# Patient Record
Sex: MALE | Race: ASIAN | NOT HISPANIC OR LATINO
[De-identification: names, ages, dates, MRNs, and addresses within clinical notes are randomized per-mention and may not be internally consistent; named-entity substitution may affect disease eponyms.]

---

## 2021-04-19 ENCOUNTER — APPOINTMENT (OUTPATIENT)
Dept: GASTROENTEROLOGY | Facility: CLINIC | Age: 61
End: 2021-04-19
Payer: COMMERCIAL

## 2021-04-19 VITALS
HEIGHT: 67 IN | BODY MASS INDEX: 18.99 KG/M2 | SYSTOLIC BLOOD PRESSURE: 120 MMHG | OXYGEN SATURATION: 12 % | RESPIRATION RATE: 70 BRPM | WEIGHT: 121 LBS | DIASTOLIC BLOOD PRESSURE: 70 MMHG

## 2021-04-19 DIAGNOSIS — Z80.3 FAMILY HISTORY OF MALIGNANT NEOPLASM OF BREAST: ICD-10-CM

## 2021-04-19 DIAGNOSIS — R07.89 OTHER CHEST PAIN: ICD-10-CM

## 2021-04-19 PROCEDURE — 99203 OFFICE O/P NEW LOW 30 MIN: CPT

## 2021-04-19 PROCEDURE — 99072 ADDL SUPL MATRL&STAF TM PHE: CPT

## 2021-04-19 RX ORDER — EZETIMIBE 10 MG/1
TABLET ORAL
Refills: 0 | Status: ACTIVE | COMMUNITY

## 2021-04-19 RX ORDER — CHLORHEXIDINE GLUCONATE, 0.12% ORAL RINSE 1.2 MG/ML
0.12 SOLUTION DENTAL
Qty: 473 | Refills: 0 | Status: DISCONTINUED | COMMUNITY
Start: 2021-01-14

## 2021-04-19 RX ORDER — ESOMEPRAZOLE MAGNESIUM 40 MG/1
40 CAPSULE, DELAYED RELEASE ORAL
Refills: 0 | Status: ACTIVE | COMMUNITY

## 2021-04-19 RX ORDER — AMOXICILLIN 500 MG/1
500 CAPSULE ORAL
Qty: 21 | Refills: 0 | Status: DISCONTINUED | COMMUNITY
Start: 2021-01-14

## 2021-04-19 RX ORDER — MONTELUKAST SODIUM 10 MG/1
TABLET, FILM COATED ORAL
Refills: 0 | Status: ACTIVE | COMMUNITY

## 2021-04-22 NOTE — HISTORY OF PRESENT ILLNESS
[FreeTextEntry1] : 61 yo male, hx of colon polyps with Dr. Packer within 1 year as well as upper endoscopy. Otherwise feels well. No N/v/ gerd or constpiation. Understand need for colonoscopy with 5 year. Pt had extesnive workup and relationiship with Dr. Packer in last few years and entire medical records were reviewed

## 2021-06-28 ENCOUNTER — APPOINTMENT (OUTPATIENT)
Dept: GASTROENTEROLOGY | Facility: CLINIC | Age: 61
End: 2021-06-28

## 2021-07-15 ENCOUNTER — NON-APPOINTMENT (OUTPATIENT)
Age: 61
End: 2021-07-15

## 2021-08-09 ENCOUNTER — APPOINTMENT (OUTPATIENT)
Dept: GASTROENTEROLOGY | Facility: CLINIC | Age: 61
End: 2021-08-09
Payer: COMMERCIAL

## 2021-08-09 VITALS
BODY MASS INDEX: 24.33 KG/M2 | WEIGHT: 155 LBS | HEIGHT: 67 IN | DIASTOLIC BLOOD PRESSURE: 80 MMHG | SYSTOLIC BLOOD PRESSURE: 140 MMHG | TEMPERATURE: 97.2 F

## 2021-08-09 DIAGNOSIS — K21.9 GASTRO-ESOPHAGEAL REFLUX DISEASE W/OUT ESOPHAGITIS: ICD-10-CM

## 2021-08-09 DIAGNOSIS — K58.9 IRRITABLE BOWEL SYNDROME W/OUT DIARRHEA: ICD-10-CM

## 2021-08-09 DIAGNOSIS — E78.5 HYPERLIPIDEMIA, UNSPECIFIED: ICD-10-CM

## 2021-08-09 PROCEDURE — 99214 OFFICE O/P EST MOD 30 MIN: CPT

## 2021-08-09 RX ORDER — DOXYCYCLINE HYCLATE 100 MG/1
100 CAPSULE ORAL
Qty: 14 | Refills: 1 | Status: ACTIVE | COMMUNITY
Start: 2021-08-09 | End: 1900-01-01

## 2021-08-09 RX ORDER — SUCRALFATE 1 G/1
1 TABLET ORAL
Refills: 0 | Status: DISCONTINUED | COMMUNITY
End: 2021-08-09

## 2021-08-09 NOTE — ASSESSMENT
[FreeTextEntry1] : 59 yo male, hx ibs, colon polyps and occ gerd, doing well. No active GI issues. Eating prudent diet; understans need for colonoscpoy within 5 years.  \par \par Pt rx doxcycline 100 mg po bid x 7 days .

## 2021-08-09 NOTE — HISTORY OF PRESENT ILLNESS
[FreeTextEntry1] : 61 yo male, hx of colon polyps with Dr. Packer within 1 year as well as upper endoscopy. Otherwise feels well. No N/v/ gerd or constpiation. Understand need for colonoscopy with 5 year. Pt had extesnive workup and relationiship with Dr. Packer in last few years .\par \par Pt returns today complaining of " gassiness."  Having normal bms. \par \par

## 2021-08-09 NOTE — PHYSICAL EXAM
[General Appearance - Alert] : alert [General Appearance - In No Acute Distress] : in no acute distress [Sclera] : the sclera and conjunctiva were normal [PERRL With Normal Accommodation] : pupils were equal in size, round, and reactive to light [Extraocular Movements] : extraocular movements were intact [Outer Ear] : the ears and nose were normal in appearance [Oropharynx] : the oropharynx was normal [Neck Appearance] : the appearance of the neck was normal [Neck Cervical Mass (___cm)] : no neck mass was observed [Jugular Venous Distention Increased] : there was no jugular-venous distention [Thyroid Diffuse Enlargement] : the thyroid was not enlarged [Thyroid Nodule] : there were no palpable thyroid nodules [Auscultation Breath Sounds / Voice Sounds] : lungs were clear to auscultation bilaterally [Heart Rate And Rhythm] : heart rate was normal and rhythm regular [Heart Sounds] : normal S1 and S2 [Heart Sounds Gallop] : no gallops [Murmurs] : no murmurs [Heart Sounds Pericardial Friction Rub] : no pericardial rub [Bowel Sounds] : normal bowel sounds [Abdomen Soft] : soft [Abdomen Tenderness] : non-tender [Abdomen Mass (___ Cm)] : no abdominal mass palpated [No CVA Tenderness] : no ~M costovertebral angle tenderness [No Spinal Tenderness] : no spinal tenderness [Abnormal Walk] : normal gait [Musculoskeletal - Swelling] : no joint swelling seen [Nail Clubbing] : no clubbing  or cyanosis of the fingernails [Motor Tone] : muscle strength and tone were normal [Skin Color & Pigmentation] : normal skin color and pigmentation [Skin Turgor] : normal skin turgor [] : no rash [Deep Tendon Reflexes (DTR)] : deep tendon reflexes were 2+ and symmetric [Sensation] : the sensory exam was normal to light touch and pinprick [No Focal Deficits] : no focal deficits [Oriented To Time, Place, And Person] : oriented to person, place, and time [Impaired Insight] : insight and judgment were intact [Affect] : the affect was normal

## 2022-01-02 ENCOUNTER — RX RENEWAL (OUTPATIENT)
Age: 62
End: 2022-01-02

## 2022-06-28 ENCOUNTER — RX RENEWAL (OUTPATIENT)
Age: 62
End: 2022-06-28

## 2022-06-28 RX ORDER — ESOMEPRAZOLE MAGNESIUM 40 MG/1
40 CAPSULE, DELAYED RELEASE ORAL
Qty: 90 | Refills: 1 | Status: ACTIVE | COMMUNITY
Start: 2021-07-13 | End: 1900-01-01

## 2024-07-16 ENCOUNTER — APPOINTMENT (OUTPATIENT)
Dept: CARDIOLOGY | Facility: CLINIC | Age: 64
End: 2024-07-16
Payer: COMMERCIAL

## 2024-07-16 ENCOUNTER — APPOINTMENT (OUTPATIENT)
Dept: CARDIOLOGY | Facility: CLINIC | Age: 64
End: 2024-07-16

## 2024-07-16 VITALS
DIASTOLIC BLOOD PRESSURE: 73 MMHG | SYSTOLIC BLOOD PRESSURE: 118 MMHG | OXYGEN SATURATION: 95 % | HEART RATE: 65 BPM | BODY MASS INDEX: 24.75 KG/M2 | RESPIRATION RATE: 18 BRPM | WEIGHT: 158 LBS

## 2024-07-16 DIAGNOSIS — E78.5 HYPERLIPIDEMIA, UNSPECIFIED: ICD-10-CM

## 2024-07-16 DIAGNOSIS — I10 ESSENTIAL (PRIMARY) HYPERTENSION: ICD-10-CM

## 2024-07-16 DIAGNOSIS — R06.02 SHORTNESS OF BREATH: ICD-10-CM

## 2024-07-16 PROCEDURE — 93000 ELECTROCARDIOGRAM COMPLETE: CPT | Mod: 59

## 2024-07-16 PROCEDURE — 99204 OFFICE O/P NEW MOD 45 MIN: CPT | Mod: 25

## 2024-07-16 PROCEDURE — 93015 CV STRESS TEST SUPVJ I&R: CPT

## 2024-07-16 PROCEDURE — 93306 TTE W/DOPPLER COMPLETE: CPT

## 2024-07-16 PROCEDURE — ZZZZZ: CPT

## 2024-08-16 ENCOUNTER — APPOINTMENT (OUTPATIENT)
Dept: CARDIOLOGY | Facility: CLINIC | Age: 64
End: 2024-08-16
Payer: COMMERCIAL

## 2024-08-16 VITALS
OXYGEN SATURATION: 96 % | BODY MASS INDEX: 25.06 KG/M2 | SYSTOLIC BLOOD PRESSURE: 167 MMHG | DIASTOLIC BLOOD PRESSURE: 79 MMHG | HEART RATE: 75 BPM | RESPIRATION RATE: 18 BRPM | WEIGHT: 160 LBS

## 2024-08-16 DIAGNOSIS — R06.02 SHORTNESS OF BREATH: ICD-10-CM

## 2024-08-16 DIAGNOSIS — I25.10 ATHEROSCLEROTIC HEART DISEASE OF NATIVE CORONARY ARTERY W/OUT ANGINA PECTORIS: ICD-10-CM

## 2024-08-16 DIAGNOSIS — I10 ESSENTIAL (PRIMARY) HYPERTENSION: ICD-10-CM

## 2024-08-16 DIAGNOSIS — E78.5 HYPERLIPIDEMIA, UNSPECIFIED: ICD-10-CM

## 2024-08-16 PROCEDURE — 99214 OFFICE O/P EST MOD 30 MIN: CPT

## 2024-08-16 PROCEDURE — G2211 COMPLEX E/M VISIT ADD ON: CPT | Mod: NC

## 2024-08-16 NOTE — REASON FOR VISIT
[FreeTextEntry3] : Dr. Sargent [FreeTextEntry1] : 63 year old M with HTN, HLD, pre-DM, severe pLAD stenosis on CCTA 8/13/24 who presents for f/u.  Meds: ASA 81, atorva 10, hydralazine 25 BID EKG 7/13/24 showed sinus rhythm without ischemic changes CCTA 8/13/24 Ca score 155, multivessel CAD (minimal LM, severe pLAD, minimal RCA, minimal LCx)

## 2024-08-16 NOTE — HISTORY OF PRESENT ILLNESS
[FreeTextEntry1] : Pt recently returned from trip. He continues to feel tired/SOB after walking on steep incline to the point where he has to stop. He has no symptoms on level ground. No CP, palpitation, dizziness, LOC, orthopnea, PND, or LE edema. He started ASA 81 without issues.  7/16/24 - Pt has been stable since last visit. He works in [employer] and walks at least 10 blocks without limitation. Occasionally when he climbs stairs or goes hiking, he feels a little tired/winded to the point where he has to stop and rest. No chest pain, palpitation, dizziness, LOC, orthopnea, PND, or LE edema.  Last seen by Dr. Dobbins 9/26/12 - Patient is asymptomatic with dizziness or palpitation but vague chest pain of atypical for angina, and exertional shortness of breath but mild.

## 2024-08-16 NOTE — ASSESSMENT
[FreeTextEntry1] : 63 year old M with HTN, HLD, pre-DM, severe pLAD stenosis on CCTA 8/13/24 who presents for f/u.  1) SOB on exertion 2) CAD, pLAD stenosis on CCTA 3) HTN, HLD - TTE 7/16/24 showed normal LV and RV function without significant valvular disease - CCTA 8/13/24 Ca score 155, multivessel CAD (minimal LM, severe pLAD, minimal RCA, minimal LCx) - Continue ASA 81mg daily - Increase atorvastatin to 20mg daily, goal LDL 55-70 - Given severe CAD on CCTA with SOB, I advised pt to undergo LHC  - Continue good BP control  4) Follow-up, 1 month pending C

## 2024-08-22 ENCOUNTER — INPATIENT (INPATIENT)
Facility: HOSPITAL | Age: 64
LOS: 0 days | Discharge: HOME | DRG: 322 | End: 2024-08-23
Attending: INTERNAL MEDICINE | Admitting: INTERNAL MEDICINE
Payer: COMMERCIAL

## 2024-08-22 ENCOUNTER — TRANSCRIPTION ENCOUNTER (OUTPATIENT)
Age: 64
End: 2024-08-22

## 2024-08-22 VITALS
DIASTOLIC BLOOD PRESSURE: 81 MMHG | HEART RATE: 58 BPM | OXYGEN SATURATION: 97 % | SYSTOLIC BLOOD PRESSURE: 167 MMHG | TEMPERATURE: 98 F | WEIGHT: 160.06 LBS | HEIGHT: 67 IN | RESPIRATION RATE: 20 BRPM

## 2024-08-22 DIAGNOSIS — R93.1 ABNORMAL FINDINGS ON DIAGNOSTIC IMAGING OF HEART AND CORONARY CIRCULATION: ICD-10-CM

## 2024-08-22 DIAGNOSIS — I25.10 ATHEROSCLEROTIC HEART DISEASE OF NATIVE CORONARY ARTERY WITHOUT ANGINA PECTORIS: ICD-10-CM

## 2024-08-22 LAB
ANION GAP SERPL CALC-SCNC: 7 MMOL/L — SIGNIFICANT CHANGE UP (ref 5–17)
BUN SERPL-MCNC: 15 MG/DL — SIGNIFICANT CHANGE UP (ref 7–23)
CALCIUM SERPL-MCNC: 9.1 MG/DL — SIGNIFICANT CHANGE UP (ref 8.5–10.1)
CHLORIDE SERPL-SCNC: 109 MMOL/L — HIGH (ref 96–108)
CO2 SERPL-SCNC: 26 MMOL/L — SIGNIFICANT CHANGE UP (ref 22–31)
CREAT SERPL-MCNC: 1.1 MG/DL — SIGNIFICANT CHANGE UP (ref 0.5–1.3)
EGFR: 75 ML/MIN/1.73M2 — SIGNIFICANT CHANGE UP
GLUCOSE SERPL-MCNC: 116 MG/DL — HIGH (ref 70–99)
HCT VFR BLD CALC: 48 % — SIGNIFICANT CHANGE UP (ref 39–50)
HGB BLD-MCNC: 15.8 G/DL — SIGNIFICANT CHANGE UP (ref 13–17)
MCHC RBC-ENTMCNC: 28.7 PG — SIGNIFICANT CHANGE UP (ref 27–34)
MCHC RBC-ENTMCNC: 32.9 GM/DL — SIGNIFICANT CHANGE UP (ref 32–36)
MCV RBC AUTO: 87.3 FL — SIGNIFICANT CHANGE UP (ref 80–100)
NRBC # BLD: 0 /100 WBCS — SIGNIFICANT CHANGE UP (ref 0–0)
PLATELET # BLD AUTO: 211 K/UL — SIGNIFICANT CHANGE UP (ref 150–400)
POTASSIUM SERPL-MCNC: 4 MMOL/L — SIGNIFICANT CHANGE UP (ref 3.5–5.3)
POTASSIUM SERPL-SCNC: 4 MMOL/L — SIGNIFICANT CHANGE UP (ref 3.5–5.3)
RBC # BLD: 5.5 M/UL — SIGNIFICANT CHANGE UP (ref 4.2–5.8)
RBC # FLD: 14.1 % — SIGNIFICANT CHANGE UP (ref 10.3–14.5)
SODIUM SERPL-SCNC: 142 MMOL/L — SIGNIFICANT CHANGE UP (ref 135–145)
WBC # BLD: 5.76 K/UL — SIGNIFICANT CHANGE UP (ref 3.8–10.5)
WBC # FLD AUTO: 5.76 K/UL — SIGNIFICANT CHANGE UP (ref 3.8–10.5)

## 2024-08-22 PROCEDURE — 93458 L HRT ARTERY/VENTRICLE ANGIO: CPT | Mod: 26,59

## 2024-08-22 PROCEDURE — 93571 IV DOP VEL&/PRESS C FLO 1ST: CPT | Mod: 26,52,LD

## 2024-08-22 PROCEDURE — 99152 MOD SED SAME PHYS/QHP 5/>YRS: CPT

## 2024-08-22 PROCEDURE — 92928 PRQ TCAT PLMT NTRAC ST 1 LES: CPT | Mod: LD

## 2024-08-22 PROCEDURE — 92978 ENDOLUMINL IVUS OCT C 1ST: CPT | Mod: 26,LD

## 2024-08-22 PROCEDURE — 93010 ELECTROCARDIOGRAM REPORT: CPT | Mod: 77

## 2024-08-22 RX ORDER — FAMOTIDINE 10 MG/ML
1 INJECTION INTRAVENOUS
Refills: 0 | DISCHARGE

## 2024-08-22 RX ORDER — SODIUM CHLORIDE 9 MG/ML
1000 INJECTION INTRAMUSCULAR; INTRAVENOUS; SUBCUTANEOUS
Refills: 0 | Status: DISCONTINUED | OUTPATIENT
Start: 2024-08-22 | End: 2024-08-23

## 2024-08-22 RX ORDER — ASCORBIC ACID/ASCORBATE SODIUM 500 MG
2 TABLET,CHEWABLE ORAL
Refills: 0 | DISCHARGE

## 2024-08-22 RX ORDER — HYDRALAZINE HCL 50 MG
1 TABLET ORAL
Refills: 0 | DISCHARGE

## 2024-08-22 RX ORDER — MONTELUKAST SODIUM 5 MG/1
10 TABLET, CHEWABLE ORAL DAILY
Refills: 0 | Status: DISCONTINUED | OUTPATIENT
Start: 2024-08-22 | End: 2024-08-23

## 2024-08-22 RX ORDER — CLOBETASOL PROPIONATE 0.5 MG/G
1 AEROSOL, FOAM TOPICAL
Refills: 0 | DISCHARGE

## 2024-08-22 RX ORDER — SODIUM CHLORIDE 9 MG/ML
1000 INJECTION INTRAMUSCULAR; INTRAVENOUS; SUBCUTANEOUS
Refills: 0 | Status: DISCONTINUED | OUTPATIENT
Start: 2024-08-22 | End: 2024-08-22

## 2024-08-22 RX ORDER — LORATADINE 10 MG/1
1 CAPSULE, LIQUID FILLED ORAL
Refills: 0 | DISCHARGE

## 2024-08-22 RX ORDER — OLOPATADINE HYDROCHLORIDE AND MOMETASONE FUROATE 25; 665 UG/1; UG/1
2 SPRAY, METERED NASAL
Refills: 0 | DISCHARGE

## 2024-08-22 RX ORDER — MONTELUKAST SODIUM 5 MG/1
1 TABLET, CHEWABLE ORAL
Refills: 0 | DISCHARGE

## 2024-08-22 RX ORDER — HYDRALAZINE HCL 50 MG
25 TABLET ORAL
Refills: 0 | Status: DISCONTINUED | OUTPATIENT
Start: 2024-08-22 | End: 2024-08-23

## 2024-08-22 RX ORDER — FAMOTIDINE 10 MG/ML
20 INJECTION INTRAVENOUS
Refills: 0 | Status: DISCONTINUED | OUTPATIENT
Start: 2024-08-22 | End: 2024-08-23

## 2024-08-22 RX ORDER — ASPIRIN 81 MG
1 TABLET, DELAYED RELEASE (ENTERIC COATED) ORAL
Qty: 90 | Refills: 3
Start: 2024-08-22 | End: 2025-08-16

## 2024-08-22 RX ORDER — ASPIRIN 81 MG
81 TABLET, DELAYED RELEASE (ENTERIC COATED) ORAL EVERY 24 HOURS
Refills: 0 | Status: DISCONTINUED | OUTPATIENT
Start: 2024-08-23 | End: 2024-08-23

## 2024-08-22 RX ORDER — LORATADINE 10 MG/1
10 CAPSULE, LIQUID FILLED ORAL DAILY
Refills: 0 | Status: DISCONTINUED | OUTPATIENT
Start: 2024-08-22 | End: 2024-08-23

## 2024-08-22 RX ADMIN — Medication 80 MILLIGRAM(S): at 22:10

## 2024-08-22 RX ADMIN — FAMOTIDINE 20 MILLIGRAM(S): 10 INJECTION INTRAVENOUS at 17:55

## 2024-08-22 RX ADMIN — SODIUM CHLORIDE 75 MILLILITER(S): 9 INJECTION INTRAMUSCULAR; INTRAVENOUS; SUBCUTANEOUS at 13:01

## 2024-08-22 RX ADMIN — Medication 25 MILLIGRAM(S): at 17:55

## 2024-08-22 RX ADMIN — SODIUM CHLORIDE 250 MILLILITER(S): 9 INJECTION INTRAMUSCULAR; INTRAVENOUS; SUBCUTANEOUS at 08:25

## 2024-08-22 NOTE — H&P CARDIOLOGY - HISTORY OF PRESENT ILLNESS
This is a 63 year old gentleman with a PMH of HTN, HLD, referred by Dr Geovanny Chavarria for a LHC with Dr Silvia Cano this am, secondary to an abnormal NST and cardiac CTA. The pt. denies any c/o CP SOB or palpitations and is in no distress.    PRE-PROCEDURE ASSESSMENT  -NPO after midnight confirmed  -IV insert  -IV fluid hydration ordered and infusing  -Patient seen and examined  -Labs reviewed  -Pre-procedure teaching completed with patient   -Consent obtained by Interventional Cardiologist  -Questions answered  TTE-7/16/24  EF 60%  Normal RV and LV function Trace MR & TR minimal to mild AR    Exercise Stress Test  7/16/24  + for ischemia  S-T depression inferiorly and V4-V6

## 2024-08-22 NOTE — CHART NOTE - NSCHARTNOTEFT_GEN_A_CORE
Post Cardiac Catheterization Chart Note      Prelim cath report:   [facility] via RRA  80% pLAD, s/p NICHOLAS x 1  full/official report to follow      Patient without complaints. Denies CP, SOB, palpitations, N/V, fever/chills, abd pain, numbness/tingling/weakness, other c/o at this time.    A+O x 3, neurologically intact  RRA access site stable (clean, dry, intact, without bleeding, heat, erythema, or hematoma). Radial band in place.  RUE motor, neuro, circ intact.  Hemodynamically stable, neurologically intact, VS stable, afebrile    Post PCI ECG: NSR, no acute changes      A/P: s/p [facility]  CAD w/ PCI as above    Admit to CPU for overnight observation post PCI  post cath/PCI routine VS, access site, neuro-vascular monitoring and RUE post access precautions ordered  post cath access site precautions reviewed with pt who verbalized good understanding  post cath hydration as ordered  Bedrest. May get OOB 30 minutes after radial band removed if wrist and hemodynamics remain stable   EKG post cath done  f/u labs and EKG in am  continue dual anti platelet therapy with aspirin AND clopidogrel   Pt education provided/reinforced re: importance of strict adherence to uninterrupted DAPT for minimum of 9-12 months (cardiologist will determine duration)  Patient educated on benefits of Cardiac Rehab Program; referral provided to patient. Referral faxed and copy placed in medical record. Patient given list of locations with phone numbers of local rehab facilities and advised to contact their insurance company for participating providers. Patient educated on need to bring discharge documents including cardiovascular history, medications, and testing/treatments to first appointment.  continue statin  may resume prior diet  Lifestyle modifications discussed to reduce cardiovascular risk factors including weight reduction, smoking cessation (referral provided if applicable), medication compliance, and routine follow up with Cardiologist to track your BMI, cholesterol, and glucose levels.   Discharge in am if stable  follow-up next week with Dr Cano for post PCI check  follow-up in 2 weeks with outpatient/referring cardiologist

## 2024-08-22 NOTE — DISCHARGE NOTE PROVIDER - CARE PROVIDER_API CALL
Geovanny Chavarria  Cardiovascular Disease  [id number] [place] [id number]  Phone: [phone]  Fax: [phone]  Follow Up Time: 2 weeks    Silvia Cano  [address] [place] [id number]  Phone: [phone]  Fax: (   )    -  Follow Up Time:    Geovanny Chavarria  Cardiovascular Disease  [address] [place] [address]  Phone: [phone]  Fax: [phone]  Follow Up Time: 2 weeks    Silvia Cano  [address] [place] [address]  Phone: [phone]  Fax: (   )    -  Scheduled Appointment: 08/30/2024 10:15 AM

## 2024-08-22 NOTE — DISCHARGE NOTE PROVIDER - NSDCMRMEDTOKEN_GEN_ALL_CORE_FT
Albuterol (Eqv-ProAir HFA) 90 mcg/inh inhalation aerosol: 2 puff(s) inhaled 2 times a day as needed for  shortness of breath and/or wheezing  ascorbic acid 500 mg oral tablet, chewable: 2 tab(s) chewed once a day  aspirin 81 mg oral delayed release tablet: 1 tab(s) orally every 24 hours  atorvastatin 80 mg oral tablet: 1 tab(s) orally once a day (at bedtime)  clobetasol 0.025% topical cream: Apply topically to affected area once a day  clopidogrel 75 mg oral tablet: 1 tab(s) orally every 24 hours  famotidine 20 mg oral tablet: 1 tab(s) orally 2 times a day  hydrALAZINE 25 mg oral tablet: 1 tab(s) orally 2 times a day  loratadine 10 mg oral tablet: 1 tab(s) orally once a day  montelukast 10 mg oral tablet: 1 tab(s) orally once a day  Ryaltris 25 mcg-665 mcg/inh nasal spray: 2 spray(s) in each nostril once a day

## 2024-08-22 NOTE — PRE-OP CHECKLIST - IDENTIFICATION BAND VERIFIED
Home health nurse Mitchel Núñez called states she visited the pt today. Nurse assessed the pt's lungs and upon assessment lungs sounds were  diminished in the lower lobes. Pt also has a cough with white sputum.  The nurse is requesting a verbal order for an xray t done

## 2024-08-22 NOTE — DISCHARGE NOTE PROVIDER - NSDCFUSCHEDAPPT_GEN_ALL_CORE_FT
Silvia Cano  [facility]  CARDIOLOGY [address]  Scheduled Appointment: 08/30/2024

## 2024-08-22 NOTE — DISCHARGE NOTE PROVIDER - NSDCFUADDINST_GEN_ALL_CORE_FT
Wound Care:   the day AFTER your procedure...     Remove the bandage from the site and gently clean with soap and water then pat dry; leave open to air.     You may take a brief shower     Do NOT apply lotions, creams, powders, ointments, or perfumes to your incision site unless prescribed by your physician     Do NOT soak your procedure site for 1 week (no baths, no pools, no tubs, etc...)     Check  your groin and /or wrist daily. A small amount of bruising, and soreness are normal    ACTIVITY: for 24 hours      - DO NOT DRIVE     - DO NOT make any important decisions or sign legal documents      - DO NOT operate heavy machinery      - you may resume sexual activity in 48 hours, unless otherwise instructed by your cardiologist          If your procedure was done through the WRIST: for the NEXT 3 DAYS:          - avoid pushing, pulling, with that affected wrist (such as pushing up from a seated position)          - avoid repeated movement of that hand and wrist (such as typing or hammering)          - DO NOT LIFT anything more than 5 pounds         If your procedure was done through the GROIN: for the NEXT 5 DAYS          - Limit climbing stairs, DO NOT soak in bathtub or pool          - no strenous activities, pushing, pulling, straining          - Do not lift anything more than 10 pounds     MEDICATION:      Please take your medications as explained to you (found on your discharge paperwork)      If you received a stent, you will be taking medication to KEEP YOUR STENT OPEN.            You MUST start taking this medication immediately.           Take this medication as prescribed and uninterrupted.            DO NOT STOP taking them for any reason without consulting with your cardiologist first.      **if you have diabetes and take metformin please do not take this medication for 2 days after the procedure. Restart and take as usual starting day 3.    Follow the heart healthy diet recommended by your doctor.   Drink plenty of water for the next 24 hours unless otherwise instructed.  Do not drink any alcoholic beverages for 24 hours (beer, wine, liquor, etc).    If you smoke: STOP SMOKING. Call the [facility] for Tobacco Control at [phone] for assistance.    CALL your cardiologist/primary care doctor to make a follow-up appointment in 2 WEEKS     **CALL YOUR DOCTOR if you experience     fever, chills, body aches, or severe pain, swelling, redness, heat or yellow discharge at incision site     bleeding or excruciating pain at the procedural site, swelling (golf ball size) at your procedural site     CHEST PAIN     numbness, tingling, temperature change (of your procedural site)     Pain  -you may have pain after your surgery or procedure at the puncture site or in the artery/vein that has been treated.  -take pain medication as directed by your doctor.  call your doctor if your pain is not getting better within 5 days or if it gets worse  -prescription pain medication should be taken with food, and can cause constipation, an over-the-counter softener may be helpful    Nausea  -anesthesia/sedation can upset your stomach  -eat bland foods (Jell-o, crackers, toast) and drink ginger ale if you are nauseated  -drink plenty of fluids such as water or ginger ale (unless instructed otherwise by your doctor)  -if you have nausea or vomiting the day after your procedure, call your doctor    Bleeding  -you may have a small amount of oozing from your surgical or procedural site  -bleeding as the site can be dangerous and should prompt immediate medical attention    Infection  -if you have any of the following signs of infection, call your doctor:       redness, swelling, fever over 101 degrees, thick yellow/white drainage    If you are unable to reach your doctor, you may contact:   Dr Silvia Cano @ [phone]    **Call 911 immediately if:     - your hand or leg becomes blue, feels cold to touch, or if you have numbness or tingling     - bleeding or swelling from your wrist or groin site cannot be controlled or if area becomes very red or hot to touch     - you have pain, pressure, tightness or burning in your chest, arms, jaw or stomach; shortness of breath; nausea or excessive sweating; lightheadedness; dizziness or a fainting spell; or if you have sudden back or stomach pain     -you have rapid heartbeat or palpitations     - you have bright red blood in large amounts, severe pain at access site (wrist or groin) or significant new swelling at the puncture site    If/because you had anesthesia, for the next 24 hours you should NOT:  -drive a car, operate power tool or machinery  -drink alcohol, beer, or wine  -make important personal or business decisions  If you had any type of sedation, you may experience lightheadedness, dizziness, or sleepiness following your procedure. A responsible adult should stay with you for at least 24 hours following your procedure.

## 2024-08-22 NOTE — DISCHARGE NOTE PROVIDER - NSDCFUADDAPPT_GEN_ALL_CORE_FT
Patient educated on benefits of Cardiac Rehab Program; referral provided to patient. Referral faxed and copy placed in medical record. Patient given list of locations with phone numbers of local rehab facilities and advised to contact their insurance company for participating providers. Patient educated on need to bring discharge documents including cardiovascular history, medications, and testing/treatments to first appointment.

## 2024-08-22 NOTE — DISCHARGE NOTE PROVIDER - NSDCCPTREATMENT_GEN_ALL_CORE_FT
PRINCIPAL PROCEDURE  Procedure: Left heart catheterization  Findings and Treatment:       SECONDARY PROCEDURE  Procedure: Placement of LAD coronary artery stent  Findings and Treatment: 8/22/24 pLAD NICHOLAS x 1

## 2024-08-22 NOTE — DISCHARGE NOTE PROVIDER - PROVIDER TOKENS
PROVIDER:[TOKEN:[[id number]],FOLLOWUP:[2 weeks]],FREE:[LAST:[Boutis],FIRST:[Silvia],PHONE:[phone]],FAX:[(   )    -],ADDRESS:[[address] [place] [address]]] PROVIDER:[TOKEN:[[id number]],FOLLOWUP:[2 weeks]],FREE:[LAST:[Boutis],FIRST:[Silvia],PHONE:[phone]],FAX:[(   )    -],ADDRESS:[[address] [place] [address]],SCHEDULEDAPPT:[08/30/2024],SCHEDULEDAPPTTIME:[10:15 AM]]

## 2024-08-22 NOTE — PATIENT PROFILE ADULT - FALL HARM RISK - HARM RISK INTERVENTIONS

## 2024-08-22 NOTE — DISCHARGE NOTE PROVIDER - HOSPITAL COURSE
63 year old male with PMH HTN, HLD, referred by Dr Geovanny Chavarria for a LHC with Dr Silvia Cano secondary to an abnormal NST and cardiac CTA. The pt denies any c/o CP SOB or palpitations and is in no distress.    TTE-7/16/24  EF 60%  Normal RV and LV function Trace MR & TR minimal to mild AR    Exercise Stress Test  7/16/24  + for ischemia  S-T depression inferiorly and V4-V6     8/22/24 s/p LHC, obstructive pLAD CAD, s/p NICHOLAS x 1     63 year old male with PMH HTN, HLD, referred by Dr Geovanny Chavarria for a LHC with Dr Silvia Cano secondary to an abnormal NST and cardiac CTA. The pt denies any c/o CP SOB or palpitations and is in no distress.    TTE-7/16/24  EF 60%  Normal RV and LV function Trace MR & TR minimal to mild AR    Exercise Stress Test  7/16/24  + for ischemia  S-T depression inferiorly and V4-V6     8/22/24 s/p LHC, obstructive pLAD CAD (80%), s/p NICHOLAS x 1     63 year old male with PMH HTN, HLD, referred by Dr Geovanny Chavarria for a LHC with Dr Silvia Cano secondary to an abnormal NST and cardiac CTA. The pt denies any c/o CP SOB or palpitations and is in no distress.  Subsequent LHC on 08/22/24 s/p LHC revealed obstructive pLAD CAD (80%) treated with successful PCI/NICHOLAS x 1.  Patient was admitted to CPU for overnight observation post PCI.  He remains chest pain free and HD stable with no acute ECG changes or significant events on telemetry post PCI.  Patient stable for discharge home today.      63 year old male with PMH HTN, HLD, referred by Dr Geovanny Chavarria for a LHC with Dr Silvia Cano secondary to an abnormal NST and cardiac CTA. The pt denies any c/o CP SOB or palpitations and is in no distress.  Subsequent LHC on 08/22/24 s/p LHC revealed obstructive pLAD CAD (80%) treated with successful PCI/NICHOLAS x 1.  Patient was admitted to CPU for overnight observation post PCI.  He remains chest pain free and HD stable with no acute ECG changes or significant events on telemetry post PCI.  Patient stable for discharge home today.   continue dual anti platelet therapy with aspirin AND clopidogrel  e scripts sent to pharmacy  Pt education provided/reinforced re: importance of strict adherence to uninterrupted DAPT for minimum of 3-12 months (cardiologist will determine duration)  continue high intensity statin   BB/ ACE deferred at this time to Cardiology F/U secondary to bradycardia in the 40s  Patient educated on benefits of Cardiac Rehab Program; referral provided to patient. Referral faxed and copy placed in medical record. Patient given list of locations with phone numbers of local rehab facilities and advised to contact their insurance company for participating providers. Patient educated on need to bring discharge documents including cardiovascular history, medications, and testing/treatments to first appointment.  Lifestyle modifications discussed to reduce cardiovascular risk factors including weight reduction, smoking cessation (referral provided if applicable), medication compliance, and routine follow up with Cardiologist to track your BMI, cholesterol, and glucose levels.   Discharge home this am  follow-up on 8/30 th with Dr Cano for post PCI check  follow-up in 2 weeks with outpatient/referring cardiologist  continue home medication regimen as appropriate

## 2024-08-23 ENCOUNTER — TRANSCRIPTION ENCOUNTER (OUTPATIENT)
Age: 64
End: 2024-08-23

## 2024-08-23 VITALS — HEART RATE: 65 BPM | SYSTOLIC BLOOD PRESSURE: 148 MMHG | DIASTOLIC BLOOD PRESSURE: 79 MMHG | RESPIRATION RATE: 18 BRPM

## 2024-08-23 PROBLEM — E78.5 HYPERLIPIDEMIA, UNSPECIFIED: Chronic | Status: ACTIVE | Noted: 2024-08-22

## 2024-08-23 LAB
ANION GAP SERPL CALC-SCNC: 6 MMOL/L — SIGNIFICANT CHANGE UP (ref 5–17)
BUN SERPL-MCNC: 13 MG/DL — SIGNIFICANT CHANGE UP (ref 7–23)
CALCIUM SERPL-MCNC: 9.5 MG/DL — SIGNIFICANT CHANGE UP (ref 8.5–10.1)
CHLORIDE SERPL-SCNC: 105 MMOL/L — SIGNIFICANT CHANGE UP (ref 96–108)
CO2 SERPL-SCNC: 29 MMOL/L — SIGNIFICANT CHANGE UP (ref 22–31)
CREAT SERPL-MCNC: 1.1 MG/DL — SIGNIFICANT CHANGE UP (ref 0.5–1.3)
EGFR: 75 ML/MIN/1.73M2 — SIGNIFICANT CHANGE UP
GLUCOSE SERPL-MCNC: 126 MG/DL — HIGH (ref 70–99)
HCT VFR BLD CALC: 49.3 % — SIGNIFICANT CHANGE UP (ref 39–50)
HGB BLD-MCNC: 16.4 G/DL — SIGNIFICANT CHANGE UP (ref 13–17)
MCHC RBC-ENTMCNC: 29.1 PG — SIGNIFICANT CHANGE UP (ref 27–34)
MCHC RBC-ENTMCNC: 33.3 GM/DL — SIGNIFICANT CHANGE UP (ref 32–36)
MCV RBC AUTO: 87.4 FL — SIGNIFICANT CHANGE UP (ref 80–100)
NRBC # BLD: 0 /100 WBCS — SIGNIFICANT CHANGE UP (ref 0–0)
PLATELET # BLD AUTO: 218 K/UL — SIGNIFICANT CHANGE UP (ref 150–400)
POTASSIUM SERPL-MCNC: 4.4 MMOL/L — SIGNIFICANT CHANGE UP (ref 3.5–5.3)
POTASSIUM SERPL-SCNC: 4.4 MMOL/L — SIGNIFICANT CHANGE UP (ref 3.5–5.3)
RBC # BLD: 5.64 M/UL — SIGNIFICANT CHANGE UP (ref 4.2–5.8)
RBC # FLD: 14.4 % — SIGNIFICANT CHANGE UP (ref 10.3–14.5)
SODIUM SERPL-SCNC: 140 MMOL/L — SIGNIFICANT CHANGE UP (ref 135–145)
WBC # BLD: 8.72 K/UL — SIGNIFICANT CHANGE UP (ref 3.8–10.5)
WBC # FLD AUTO: 8.72 K/UL — SIGNIFICANT CHANGE UP (ref 3.8–10.5)

## 2024-08-23 PROCEDURE — 85027 COMPLETE CBC AUTOMATED: CPT

## 2024-08-23 PROCEDURE — 80048 BASIC METABOLIC PNL TOTAL CA: CPT

## 2024-08-23 PROCEDURE — 92978 ENDOLUMINL IVUS OCT C 1ST: CPT | Mod: LD

## 2024-08-23 PROCEDURE — 36415 COLL VENOUS BLD VENIPUNCTURE: CPT

## 2024-08-23 PROCEDURE — C1725: CPT

## 2024-08-23 PROCEDURE — C9600: CPT | Mod: LD

## 2024-08-23 PROCEDURE — 93010 ELECTROCARDIOGRAM REPORT: CPT

## 2024-08-23 PROCEDURE — C1894: CPT

## 2024-08-23 PROCEDURE — 93458 L HRT ARTERY/VENTRICLE ANGIO: CPT | Mod: 59

## 2024-08-23 PROCEDURE — 99231 SBSQ HOSP IP/OBS SF/LOW 25: CPT

## 2024-08-23 PROCEDURE — C1753: CPT

## 2024-08-23 PROCEDURE — C1769: CPT

## 2024-08-23 PROCEDURE — 93799 UNLISTED CV SVC/PROCEDURE: CPT

## 2024-08-23 PROCEDURE — C1874: CPT

## 2024-08-23 PROCEDURE — 93005 ELECTROCARDIOGRAM TRACING: CPT

## 2024-08-23 PROCEDURE — C1887: CPT

## 2024-08-23 RX ADMIN — Medication 75 MILLIGRAM(S): at 05:21

## 2024-08-23 RX ADMIN — Medication 25 MILLIGRAM(S): at 05:20

## 2024-08-23 RX ADMIN — FAMOTIDINE 20 MILLIGRAM(S): 10 INJECTION INTRAVENOUS at 05:21

## 2024-08-23 RX ADMIN — Medication 81 MILLIGRAM(S): at 05:20

## 2024-08-23 NOTE — PROGRESS NOTE ADULT - SUBJECTIVE AND OBJECTIVE BOX
Department of Cardiology                                                                     Division of Interventional Cardiology                                                                  [facility]/ [place] [address]                                                                             Telephone: [phone]                                                    Post- Procedure Note: s/p Left Heart Cardiac Catheterization/ s/p PCI pLAD ( 80% occluded) via RRA by Dr Silvia Cano      24-Pt seen and examined this am -no overnight events-SInus yaya in the [id number] BB at this time. OOB ambulating. Denies any c/o Pt education r/t lifestyle modifications/ resources ( [email or web address]) and adherance to medications/ cardiology appts reviewed.     Subjective/ROS:  Denies CP, palpitations, SOB, N/V, fever/chills, dizziness, weakness, numbness/tingling.    Study Date:     2024   Name:           PEREZ BARROSO   :            1960   (63 years)   Gender:         male   MR#:            [id number]   MPI#:           [id number]   Patient Class:  Outpatient     Cath Lab Report    Diagnostic Cardiologist:       Silvia Cano MD   Interventional Cardiologist:   Silvia Cano MD   Referring Physician:           Geovanny Chavarria MD     Procedures Performed   Procedures:              1.    Arterial Access - Right Radial     2.    Left Heart Cath   3.  Diagnostic Coronary Angiography   4.    iFR/dFR/rFR   5.    IVUS   6.    PCI: NICHOLAS     Indications:               CCS Class I   Cardiac: abnormal cardiovascular testing     Conclusions:   Severe prox LAD confirmed with IVUs and IFR. - s/p 1 NICHOLAS.      Recommendations:     DAPT for 1 yr   Acute complication:    No complications     Presentation:   Chief Compaint: Worsening PALMA and abnormal cardiac CT     Procedure Narrative:   The risks and alternatives of the procedures and conscious sedation  were explained to the patient and informed consent was  obtained. The patient was brought to the cath lab and placed on the  exam table.  Access   Right radial artery:   The puncture site was infiltrated with 1% Lidocaine. Vascular access  was obtained using modified seldinger technique under  ultrasound imaging guidance and a 6FR PRELUDE KIT was advanced into  the vessel.    Coronary Angiography   Left Coronary System:   A catheter was positioned into the vessel ostia under fluoroscopic  guidance. Angiograms were obtained in multiple views.  Right Coronary System:   A catheter was positioned into the vessel ostia under fluoroscopic  guidance. Angiograms were obtained in multiple views.    Diagnostic Findings:     Patient: PEREZ BARROSO   MRN: [id number]  Study Date: 2024   11:02 AM      Page 1 of 5          Coronary Angiography   The coronary circulation is right dominant.      LM   Left main artery: The segment is visually normal in size and  structure.    LAD   Proximal left anterior descending: There is an 80 % stenosis. Instant  wave-free ratio was performed with a calculated value of  0.87. Based on the results, the lesion was judged to be significant.  Intravascular ultrasound was performed. Imaging shows  minimal cross sectional area of 3.9 mm2. First diagonal: The segment  is small. There is a 70 % stenosis.    CX   Circumflex: Angiography shows minor irregularities.      RCA   Proximal right coronary artery: There is a 40 % stenosis.      Left Heart Cath   LHC performed: Aortic valve crossed and left ventricular pressures  were obtained.    X-Ray:   Diagnostic Flouro time:       6 min.                       Exam record  DAP:  Interventional Flouro time:   5.4 min.                     Exam fluoro  DAP:     2132.00 cGycm2  Total Flouro Time:            11.4 min.                    Exam total  DAP:             2132.00  cGycm2    Diagnostic Physician Signature:     Electronically signed by Silvia Cano MD on 2024 at 01:07 PM     Interventional Findings:     Interventional Details   Proximal left anterior descending: The initial stenosis was 80 %. IVUS  was performed. Guidewire crossing was successful.    A successful Drug Eluting Stent was deployed using a 6FR JL 3.5  LAUNCHER During Procedure, a BMW UNIVERSAL 190  During Procedure, and a 3.00 X 33 SKYPOINT During Procedure.      The inflation pressure was 18 domonique for the duration of 7.0 seconds.     A successful Balloon angioplasty was performed using a 3.50 X 15 NC  TREK GILBERTO RX During Procedure.    Patient: PEREZ BARROSO              MRN: [id number]  Study Date: 2024   11:02 AM      Page 2 of 5        HPI:  This is a 63 year old gentleman with a PMH of HTN, HLD, referred by Dr Geovanny Chavarria for a LHC with Dr Silvia Cano this am, secondary to an abnormal NST and cardiac CTA. The pt. denies any c/o CP SOB or palpitations and is in no distress.    PRE-PROCEDURE ASSESSMENT  -NPO after midnight confirmed  -IV insert  -IV fluid hydration ordered and infusing  -Patient seen and examined  -Labs reviewed  -Pre-procedure teaching completed with patient   -Consent obtained by Interventional Cardiologist  -Questions answered  TTE-24  EF 60%  Normal RV and LV function Trace MR & TR minimal to mild AR    Exercise Stress Test  24  + for ischemia  S-T depression inferiorly and V4-V6    (22 Aug 2024 08:02)      PAST MEDICAL & SURGICAL HISTORY:  Hypertension      Hyperlipidemia          MEDICATIONS  (STANDING):  aspirin enteric coated 81 milliGRAM(s) Oral every 24 hours  atorvastatin 80 milliGRAM(s) Oral at bedtime  clopidogrel Tablet 75 milliGRAM(s) Oral every 24 hours  famotidine    Tablet 20 milliGRAM(s) Oral two times a day  hydrALAZINE 25 milliGRAM(s) Oral two times a day  loratadine 10 milliGRAM(s) Oral daily  montelukast 10 milliGRAM(s) Oral daily  sodium chloride 0.9%. 1000 milliLiter(s) (75 mL/Hr) IV Continuous <Continuous>    MEDICATIONS  (PRN):    Allergies: niacin (Rash)                            16.4   8.72  )-----------( 218      ( 23 Aug 2024 05:47 )             49.3         140  |  105  |  13  ----------------------------<  126<H>  4.4   |  29  |  1.10    Ca    9.5      23 Aug 2024 05:47            Vital Signs Last 24 Hrs  T(C): 36.6 (23 Aug 2024 04:21), Max: 36.8 (22 Aug 2024 17:30)  T(F): 97.8 (23 Aug 2024 04:21), Max: 98.2 (22 Aug 2024 17:30)  HR: 58 (23 Aug 2024 06:00) (47 - 71)  BP: 124/74 (23 Aug 2024 02:00) (124/74 - 177/88)  BP(mean): 94 (23 Aug 2024 02:00) (94 - 124)  RR: 24 (23 Aug 2024 06:00) (13 - 24)  SpO2: 95% (23 Aug 2024 06:00) (94% - 99%)    Parameters below as of 23 Aug 2024 02:00  Patient On (Oxygen Delivery Method): room air      Post PCI EC/23-SInus Yaya HR 59 No acute changes    Constitutional: NAD  Neuro: A+O x 3, non-focal, speech clear and intact  HEENT: NC/AT, PERRL, EOMI, anicteric sclerae, oral mucosa pink and moist  Neck: supple, no JVD  CV: regular rate, regular rhythm, +S1S2, no murmurs or rub  Pulm/chest: lung sounds CTA and equal bilaterally, no accessory muscle use noted  Abd: soft, NT, ND, +BS  Ext: FULLER x 4, no C/C/E  Access site: R wrist C/D/I/soft without hematoma, distal motor/neuro/circ intact. R radial pulse 2+. Tegaderm drsg removed.  Skin: warm, well perfused  Psych: calm, appropriate affect

## 2024-08-23 NOTE — DISCHARGE NOTE NURSING/CASE MANAGEMENT/SOCIAL WORK - NSDCPEFALRISK_GEN_ALL_CORE
For information on Fall & Injury Prevention, visit: [email or web address] OR  [email or web address] OR  https://www.cdc.gov/steadi/patient.html

## 2024-08-23 NOTE — PROGRESS NOTE ADULT - ASSESSMENT
s/p Left Heart Cardiac Catheterization/ s/p PCI pLAD ( 80% occluded) via RRA by Dr Silvia Cano      Admitted to CPU for overnight observation post PCI  post cath/PCI routine VS, access site, neuro-vascular monitoring and RUE post access precautions ordered  post cath hydration given ordered  EKG post cath done  f/u labs and EKG reviewed this am  continue dual anti platelet therapy with aspirin AND clopidogrel  e scripts sent to pharmacy  Pt education provided/reinforced re: importance of strict adherence to uninterrupted DAPT for minimum of 3-12 months (cardiologist will determine duration)  continue high intensity statin   BB/ ACE deferred at this time to Cardiology F/U secondary to bradycardia in the 40s  Patient educated on benefits of Cardiac Rehab Program; referral provided to patient. Referral faxed and copy placed in medical record. Patient given list of locations with phone numbers of local rehab facilities and advised to contact their insurance company for participating providers. Patient educated on need to bring discharge documents including cardiovascular history, medications, and testing/treatments to first appointment.  Lifestyle modifications discussed to reduce cardiovascular risk factors including weight reduction, smoking cessation (referral provided if applicable), medication compliance, and routine follow up with Cardiologist to track your BMI, cholesterol, and glucose levels.   Discharge home this am  follow-up on 8/30 th with Dr Cano for post PCI check  follow-up in 2 weeks with outpatient/referring cardiologist  continue home medication regimen as appropriate

## 2024-08-23 NOTE — DISCHARGE NOTE NURSING/CASE MANAGEMENT/SOCIAL WORK - PATIENT PORTAL LINK FT
You can access the FollowMyHealth Patient Portal offered by [facility] by registering at the following website: [email or web address]. By joining [facility]’s FollowMyHealth portal, you will also be able to view your health information using other applications (apps) compatible with our system.

## 2024-08-24 ENCOUNTER — TRANSCRIPTION ENCOUNTER (OUTPATIENT)
Age: 64
End: 2024-08-24

## 2024-08-30 ENCOUNTER — APPOINTMENT (OUTPATIENT)
Dept: CARDIOLOGY | Facility: CLINIC | Age: 64
End: 2024-08-30
Payer: COMMERCIAL

## 2024-08-30 ENCOUNTER — NON-APPOINTMENT (OUTPATIENT)
Age: 64
End: 2024-08-30

## 2024-08-30 VITALS
HEART RATE: 76 BPM | SYSTOLIC BLOOD PRESSURE: 164 MMHG | HEIGHT: 67 IN | OXYGEN SATURATION: 97 % | DIASTOLIC BLOOD PRESSURE: 73 MMHG | WEIGHT: 158 LBS | BODY MASS INDEX: 24.8 KG/M2 | TEMPERATURE: 97.2 F

## 2024-08-30 DIAGNOSIS — I10 ESSENTIAL (PRIMARY) HYPERTENSION: ICD-10-CM

## 2024-08-30 DIAGNOSIS — I25.10 ATHEROSCLEROTIC HEART DISEASE OF NATIVE CORONARY ARTERY W/OUT ANGINA PECTORIS: ICD-10-CM

## 2024-08-30 DIAGNOSIS — E78.5 HYPERLIPIDEMIA, UNSPECIFIED: ICD-10-CM

## 2024-08-30 PROCEDURE — 93000 ELECTROCARDIOGRAM COMPLETE: CPT

## 2024-08-30 PROCEDURE — 99214 OFFICE O/P EST MOD 30 MIN: CPT

## 2024-09-01 NOTE — HISTORY OF PRESENT ILLNESS
[FreeTextEntry1] : Here for access site check feels well No CP No palps No bleeding compliant to meds

## 2024-09-01 NOTE — DISCUSSION/SUMMARY
[FreeTextEntry1] : s/p LAD PCI Doing well No CP Compliant with meds Radial site healing well F/u with Dr. Chavarria [EKG obtained to assist in diagnosis and management of assessed problem(s)] : EKG obtained to assist in diagnosis and management of assessed problem(s)

## 2024-09-01 NOTE — PHYSICAL EXAM
[Well Developed] : well developed [Well Nourished] : well nourished [No Acute Distress] : no acute distress [Normal Conjunctiva] : normal conjunctiva [Normal Venous Pressure] : normal venous pressure [No Carotid Bruit] : no carotid bruit [Normal S1, S2] : normal S1, S2 [No Murmur] : no murmur [No Rub] : no rub [No Gallop] : no gallop [Clear Lung Fields] : clear lung fields [Good Air Entry] : good air entry [Soft] : abdomen soft [No Respiratory Distress] : no respiratory distress  [Non Tender] : non-tender [No Masses/organomegaly] : no masses/organomegaly [Normal Bowel Sounds] : normal bowel sounds [Normal Gait] : normal gait [No Edema] : no edema [No Cyanosis] : no cyanosis [No Clubbing] : no clubbing [No Varicosities] : no varicosities [No Rash] : no rash [No Skin Lesions] : no skin lesions [Moves all extremities] : moves all extremities [No Focal Deficits] : no focal deficits [Normal Speech] : normal speech [Alert and Oriented] : alert and oriented [Normal memory] : normal memory [de-identified] : Radial site, +2 pulse

## 2024-09-01 NOTE — PHYSICAL EXAM
[Well Developed] : well developed [Well Nourished] : well nourished [No Acute Distress] : no acute distress [Normal Conjunctiva] : normal conjunctiva [Normal Venous Pressure] : normal venous pressure [No Carotid Bruit] : no carotid bruit [Normal S1, S2] : normal S1, S2 [No Murmur] : no murmur [No Rub] : no rub [No Gallop] : no gallop [Clear Lung Fields] : clear lung fields [Good Air Entry] : good air entry [Soft] : abdomen soft [No Respiratory Distress] : no respiratory distress  [Non Tender] : non-tender [No Masses/organomegaly] : no masses/organomegaly [Normal Bowel Sounds] : normal bowel sounds [Normal Gait] : normal gait [No Edema] : no edema [No Cyanosis] : no cyanosis [No Clubbing] : no clubbing [No Varicosities] : no varicosities [No Rash] : no rash [No Skin Lesions] : no skin lesions [Moves all extremities] : moves all extremities [No Focal Deficits] : no focal deficits [Normal Speech] : normal speech [Alert and Oriented] : alert and oriented [Normal memory] : normal memory [de-identified] : Radial site, +2 pulse

## 2024-09-18 ENCOUNTER — APPOINTMENT (OUTPATIENT)
Dept: CARDIOLOGY | Facility: CLINIC | Age: 64
End: 2024-09-18
Payer: COMMERCIAL

## 2024-09-18 VITALS
BODY MASS INDEX: 23.81 KG/M2 | HEART RATE: 66 BPM | SYSTOLIC BLOOD PRESSURE: 115 MMHG | DIASTOLIC BLOOD PRESSURE: 71 MMHG | WEIGHT: 152 LBS | RESPIRATION RATE: 18 BRPM | OXYGEN SATURATION: 97 %

## 2024-09-18 DIAGNOSIS — E78.5 HYPERLIPIDEMIA, UNSPECIFIED: ICD-10-CM

## 2024-09-18 DIAGNOSIS — I25.10 ATHEROSCLEROTIC HEART DISEASE OF NATIVE CORONARY ARTERY W/OUT ANGINA PECTORIS: ICD-10-CM

## 2024-09-18 DIAGNOSIS — I10 ESSENTIAL (PRIMARY) HYPERTENSION: ICD-10-CM

## 2024-09-18 PROCEDURE — 99214 OFFICE O/P EST MOD 30 MIN: CPT

## 2024-09-18 PROCEDURE — G2211 COMPLEX E/M VISIT ADD ON: CPT | Mod: NC

## 2024-09-18 RX ORDER — ASPIRIN ENTERIC COATED TABLETS 81 MG 81 MG/1
81 TABLET, DELAYED RELEASE ORAL DAILY
Refills: 0 | Status: ACTIVE | COMMUNITY

## 2024-09-18 RX ORDER — ATORVASTATIN CALCIUM 80 MG/1
80 TABLET, FILM COATED ORAL DAILY
Refills: 0 | Status: ACTIVE | COMMUNITY

## 2024-09-18 RX ORDER — HYDRALAZINE HYDROCHLORIDE 25 MG/1
25 TABLET ORAL TWICE DAILY
Refills: 0 | Status: ACTIVE | COMMUNITY

## 2024-09-18 RX ORDER — CLOPIDOGREL BISULFATE 75 MG/1
75 TABLET, FILM COATED ORAL DAILY
Refills: 0 | Status: ACTIVE | COMMUNITY

## 2024-09-18 NOTE — ASSESSMENT
[FreeTextEntry1] : 64 year old M with HTN, HLD, pre-DM, severe pLAD stenosis on CCTA 8/13/24 now s/p C 8/22/24 s/p NICHOLAS to pLAD who presents for f/u.  1) CAD s/p PCI to LAD 2) HTN, HLD - TTE 7/16/24 showed normal LV and RV function without significant valvular disease - CCTA 8/13/24 Ca score 155, multivessel CAD (minimal LM, severe pLAD, minimal RCA, minimal LCx) - [facility] 8/22/24 showed pLAD 80%, D1 70%, pRCA 40% s/p NICHOLAS to pLAD - Continue ASA 81 and Plavix 75mg daily at least 1 year if possible - Continue atorvastatin 80mg daily, goal LDL 55 - SOB resolved after PCI. Continue to monitor - Continue good BP control  3) Follow-up, 2 months

## 2024-09-18 NOTE — HISTORY OF PRESENT ILLNESS
[FreeTextEntry1] : Pt feels like his SOB has resolved since PCI to LAD. He feels well without CP or SOB. No palpitation, dizziness, LOC, orthopnea, PND, or LE edema. He has no issues with bleeding.  8/16/24 - Pt recently returned from trip. He continues to feel tired/SOB after walking on steep incline to the point where he has to stop. He has no symptoms on level ground. No CP, palpitation, dizziness, LOC, orthopnea, PND, or LE edema. He started ASA 81 without issues.  7/16/24 - Pt has been stable since last visit. He works in [employer] and walks at least 10 blocks without limitation. Occasionally when he climbs stairs or goes hiking, he feels a little tired/winded to the point where he has to stop and rest. No chest pain, palpitation, dizziness, LOC, orthopnea, PND, or LE edema.  Last seen by Dr. Dobbins 9/26/12 - Patient is asymptomatic with dizziness or palpitation but vague chest pain of atypical for angina, and exertional shortness of breath but mild.

## 2024-09-18 NOTE — HISTORY OF PRESENT ILLNESS
[FreeTextEntry1] : Pt feels like his SOB has resolved since PCI to LAD. He feels well without CP or SOB. No palpitation, dizziness, LOC, orthopnea, PND, or LE edema. He has no issues with bleeding.  8/16/24 - Pt recently returned from trip. He continues to feel tired/SOB after walking on steep incline to the point where he has to stop. He has no symptoms on level ground. No CP, palpitation, dizziness, LOC, orthopnea, PND, or LE edema. He started ASA 81 without issues.  7/16/24 - Pt has been stable since last visit. He works in [employer] and walks at least 10 blocks without limitation. Occasionally when he climbs stairs or goes hiking, he feels a little tired/winded to the point where he has to stop and rest. No chest pain, palpitation, dizziness, LOC, orthopnea, PND, or LE edema.  Last seen by Dr. Dobbins 9/26/12 - Patient is asymptomatic with dizziness or palpitation but vague chest pain of atypical for angina, and exertional shortness of breath but mild.

## 2024-09-18 NOTE — ASSESSMENT
[FreeTextEntry1] : 64 year old M with HTN, HLD, pre-DM, severe pLAD stenosis on CCTA 8/13/24 now s/p C 8/22/24 s/p NICHOLAS to pLAD who presents for f/u.  1) CAD s/p PCI to LAD 2) HTN, HLD - TTE 7/16/24 showed normal LV and RV function without significant valvular disease - CCTA 8/13/24 Ca score 155, multivessel CAD (minimal LM, severe pLAD, minimal RCA, minimal LCx) - [facility] 8/22/24 showed pLAD 80%, D1 70%, pRCA 40% s/p NICHOLAS to pLAD - Continue ASA 81 and Plavix 75mg daily at least 1 year if possible - Continue atorvastatin 80mg daily, goal LDL 55 - SOB resolved after PCI. Continue to monitor - Continue good BP control  3) Follow-up, 2 months

## 2024-09-18 NOTE — REASON FOR VISIT
[Coronary Artery Disease] : coronary artery disease [FreeTextEntry3] : Dr. Sargent [FreeTextEntry1] : 64 year old M with HTN, HLD, pre-DM, severe pLAD stenosis on CCTA 8/13/24 now s/p LHC 8/22/24 s/p NICHOLAS to pLAD who presents for f/u.  Meds: ASA 81, Plavix 75, atorvastatin 80, hydralazine 25 BID EKG 7/13/24 showed sinus rhythm without ischemic changes CCTA 8/13/24 Ca score 155, multivessel CAD (minimal LM, severe pLAD, minimal RCA, minimal LCx)

## 2024-11-20 ENCOUNTER — NON-APPOINTMENT (OUTPATIENT)
Age: 64
End: 2024-11-20

## 2024-11-20 ENCOUNTER — APPOINTMENT (OUTPATIENT)
Dept: CARDIOLOGY | Facility: CLINIC | Age: 64
End: 2024-11-20
Payer: COMMERCIAL

## 2024-11-20 VITALS
SYSTOLIC BLOOD PRESSURE: 141 MMHG | OXYGEN SATURATION: 96 % | WEIGHT: 148 LBS | HEART RATE: 66 BPM | DIASTOLIC BLOOD PRESSURE: 78 MMHG | BODY MASS INDEX: 23.18 KG/M2 | RESPIRATION RATE: 18 BRPM

## 2024-11-20 DIAGNOSIS — I10 ESSENTIAL (PRIMARY) HYPERTENSION: ICD-10-CM

## 2024-11-20 DIAGNOSIS — E78.5 HYPERLIPIDEMIA, UNSPECIFIED: ICD-10-CM

## 2024-11-20 DIAGNOSIS — I25.10 ATHEROSCLEROTIC HEART DISEASE OF NATIVE CORONARY ARTERY W/OUT ANGINA PECTORIS: ICD-10-CM

## 2024-11-20 PROCEDURE — 99214 OFFICE O/P EST MOD 30 MIN: CPT

## 2024-11-20 PROCEDURE — G2211 COMPLEX E/M VISIT ADD ON: CPT | Mod: NC

## 2024-11-20 PROCEDURE — 93000 ELECTROCARDIOGRAM COMPLETE: CPT

## 2025-03-19 ENCOUNTER — NON-APPOINTMENT (OUTPATIENT)
Age: 65
End: 2025-03-19

## 2025-03-19 ENCOUNTER — APPOINTMENT (OUTPATIENT)
Dept: CARDIOLOGY | Facility: CLINIC | Age: 65
End: 2025-03-19
Payer: COMMERCIAL

## 2025-03-19 VITALS
HEART RATE: 65 BPM | OXYGEN SATURATION: 97 % | BODY MASS INDEX: 23.49 KG/M2 | SYSTOLIC BLOOD PRESSURE: 144 MMHG | RESPIRATION RATE: 16 BRPM | WEIGHT: 150 LBS | DIASTOLIC BLOOD PRESSURE: 74 MMHG

## 2025-03-19 DIAGNOSIS — I10 ESSENTIAL (PRIMARY) HYPERTENSION: ICD-10-CM

## 2025-03-19 DIAGNOSIS — I25.10 ATHEROSCLEROTIC HEART DISEASE OF NATIVE CORONARY ARTERY W/OUT ANGINA PECTORIS: ICD-10-CM

## 2025-03-19 DIAGNOSIS — E78.5 HYPERLIPIDEMIA, UNSPECIFIED: ICD-10-CM

## 2025-03-19 PROCEDURE — 99214 OFFICE O/P EST MOD 30 MIN: CPT

## 2025-03-19 PROCEDURE — G2211 COMPLEX E/M VISIT ADD ON: CPT | Mod: NC

## 2025-03-19 PROCEDURE — 93000 ELECTROCARDIOGRAM COMPLETE: CPT

## 2025-04-16 ENCOUNTER — NON-APPOINTMENT (OUTPATIENT)
Age: 65
End: 2025-04-16

## 2025-04-18 ENCOUNTER — APPOINTMENT (OUTPATIENT)
Dept: GASTROENTEROLOGY | Facility: CLINIC | Age: 65
End: 2025-04-18
Payer: COMMERCIAL

## 2025-04-18 VITALS
HEIGHT: 67 IN | SYSTOLIC BLOOD PRESSURE: 120 MMHG | OXYGEN SATURATION: 98 % | RESPIRATION RATE: 16 BRPM | HEART RATE: 78 BPM | DIASTOLIC BLOOD PRESSURE: 70 MMHG | BODY MASS INDEX: 24.01 KG/M2 | WEIGHT: 153 LBS

## 2025-04-18 DIAGNOSIS — I25.10 ATHEROSCLEROTIC HEART DISEASE OF NATIVE CORONARY ARTERY W/OUT ANGINA PECTORIS: ICD-10-CM

## 2025-04-18 DIAGNOSIS — Z12.11 ENCOUNTER FOR SCREENING FOR MALIGNANT NEOPLASM OF COLON: ICD-10-CM

## 2025-04-18 DIAGNOSIS — I10 ESSENTIAL (PRIMARY) HYPERTENSION: ICD-10-CM

## 2025-04-18 DIAGNOSIS — E78.5 HYPERLIPIDEMIA, UNSPECIFIED: ICD-10-CM

## 2025-04-18 DIAGNOSIS — K21.9 GASTRO-ESOPHAGEAL REFLUX DISEASE W/OUT ESOPHAGITIS: ICD-10-CM

## 2025-04-18 PROCEDURE — 99203 OFFICE O/P NEW LOW 30 MIN: CPT

## 2025-04-22 ENCOUNTER — RX CHANGE (OUTPATIENT)
Age: 65
End: 2025-04-22

## 2025-04-22 RX ORDER — SODIUM PICOSULFATE, MAGNESIUM OXIDE, AND ANHYDROUS CITRIC ACID 12; 3.5; 1 G/175ML; G/175ML; MG/175ML
10-3.5-12 MG-GM LIQUID ORAL
Qty: 350 | Refills: 1 | Status: DISCONTINUED | COMMUNITY
Start: 2025-04-18 | End: 2025-04-22

## 2025-04-22 RX ORDER — SODIUM PICOSULFATE, MAGNESIUM OXIDE, AND ANHYDROUS CITRIC ACID 12; 3.5; 1 G/175ML; G/175ML; MG/175ML
10-3.5-12 MG-GM LIQUID ORAL
Qty: 350 | Refills: 1 | Status: ACTIVE | COMMUNITY
Start: 1900-01-01 | End: 1900-01-01

## 2025-08-20 ENCOUNTER — APPOINTMENT (OUTPATIENT)
Dept: CARDIOLOGY | Facility: CLINIC | Age: 65
End: 2025-08-20
Payer: COMMERCIAL

## 2025-08-20 ENCOUNTER — NON-APPOINTMENT (OUTPATIENT)
Age: 65
End: 2025-08-20

## 2025-08-20 VITALS
HEART RATE: 66 BPM | BODY MASS INDEX: 25.37 KG/M2 | RESPIRATION RATE: 18 BRPM | OXYGEN SATURATION: 96 % | SYSTOLIC BLOOD PRESSURE: 143 MMHG | DIASTOLIC BLOOD PRESSURE: 78 MMHG | WEIGHT: 162 LBS

## 2025-08-20 DIAGNOSIS — I25.10 ATHEROSCLEROTIC HEART DISEASE OF NATIVE CORONARY ARTERY W/OUT ANGINA PECTORIS: ICD-10-CM

## 2025-08-20 DIAGNOSIS — I10 ESSENTIAL (PRIMARY) HYPERTENSION: ICD-10-CM

## 2025-08-20 DIAGNOSIS — E78.5 HYPERLIPIDEMIA, UNSPECIFIED: ICD-10-CM

## 2025-08-20 DIAGNOSIS — R06.02 SHORTNESS OF BREATH: ICD-10-CM

## 2025-08-20 PROCEDURE — 93000 ELECTROCARDIOGRAM COMPLETE: CPT

## 2025-08-20 PROCEDURE — G2211 COMPLEX E/M VISIT ADD ON: CPT | Mod: NC

## 2025-08-20 PROCEDURE — 99214 OFFICE O/P EST MOD 30 MIN: CPT

## 2025-08-20 RX ORDER — ATORVASTATIN CALCIUM 80 MG/1
80 TABLET, FILM COATED ORAL
Qty: 90 | Refills: 1 | Status: ACTIVE | COMMUNITY
Start: 2025-08-20 | End: 1900-01-01

## 2025-09-04 RX ORDER — SODIUM SULFATE, POTASSIUM SULFATE AND MAGNESIUM SULFATE 1.6; 3.13; 17.5 G/177ML; G/177ML; G/177ML
17.5-3.13-1.6 SOLUTION ORAL TWICE DAILY
Qty: 2 | Refills: 0 | Status: ACTIVE | COMMUNITY
Start: 2025-09-04 | End: 1900-01-01

## 2025-09-17 ENCOUNTER — NON-APPOINTMENT (OUTPATIENT)
Age: 65
End: 2025-09-17